# Patient Record
Sex: FEMALE | Race: BLACK OR AFRICAN AMERICAN | Employment: FULL TIME | URBAN - METROPOLITAN AREA
[De-identification: names, ages, dates, MRNs, and addresses within clinical notes are randomized per-mention and may not be internally consistent; named-entity substitution may affect disease eponyms.]

---

## 2021-01-01 ENCOUNTER — APPOINTMENT (OUTPATIENT)
Dept: GENERAL RADIOLOGY | Age: 46
End: 2021-01-01
Attending: EMERGENCY MEDICINE
Payer: COMMERCIAL

## 2021-01-01 ENCOUNTER — HOSPITAL ENCOUNTER (EMERGENCY)
Age: 46
Discharge: HOME | End: 2021-01-01
Attending: EMERGENCY MEDICINE
Payer: COMMERCIAL

## 2021-01-01 VITALS
TEMPERATURE: 97.6 F | HEART RATE: 83 BPM | OXYGEN SATURATION: 100 % | HEIGHT: 61 IN | WEIGHT: 173 LBS | RESPIRATION RATE: 14 BRPM | SYSTOLIC BLOOD PRESSURE: 129 MMHG | BODY MASS INDEX: 32.66 KG/M2 | DIASTOLIC BLOOD PRESSURE: 89 MMHG

## 2021-01-01 DIAGNOSIS — R51.9 SINUS HEADACHE: Primary | ICD-10-CM

## 2021-01-01 DIAGNOSIS — R42 DIZZINESS: ICD-10-CM

## 2021-01-01 DIAGNOSIS — J01.91 ACUTE RECURRENT SINUSITIS, UNSPECIFIED LOCATION: ICD-10-CM

## 2021-01-01 LAB
ALBUMIN SERPL-MCNC: 3.5 G/DL (ref 3.4–5)
ALBUMIN/GLOB SERPL: 1.1 {RATIO} (ref 0.8–1.7)
ALP SERPL-CCNC: 74 U/L (ref 45–117)
ALT SERPL-CCNC: 22 U/L (ref 13–56)
ANION GAP SERPL CALC-SCNC: 6 MMOL/L (ref 3–18)
AST SERPL-CCNC: 8 U/L (ref 10–38)
ATRIAL RATE: 75 BPM
BASOPHILS # BLD: 0 K/UL (ref 0–0.1)
BASOPHILS NFR BLD: 1 % (ref 0–2)
BILIRUB SERPL-MCNC: 0.3 MG/DL (ref 0.2–1)
BUN SERPL-MCNC: 18 MG/DL (ref 7–18)
BUN/CREAT SERPL: 19 (ref 12–20)
CALCIUM SERPL-MCNC: 9.1 MG/DL (ref 8.5–10.1)
CALCULATED P AXIS, ECG09: 56 DEGREES
CALCULATED R AXIS, ECG10: 5 DEGREES
CALCULATED T AXIS, ECG11: 45 DEGREES
CHLORIDE SERPL-SCNC: 106 MMOL/L (ref 100–111)
CK MB CFR SERPL CALC: NORMAL % (ref 0–4)
CK MB SERPL-MCNC: <1 NG/ML (ref 5–25)
CK SERPL-CCNC: 30 U/L (ref 26–192)
CO2 SERPL-SCNC: 28 MMOL/L (ref 21–32)
CREAT SERPL-MCNC: 0.95 MG/DL (ref 0.6–1.3)
DIAGNOSIS, 93000: NORMAL
DIFFERENTIAL METHOD BLD: NORMAL
EOSINOPHIL # BLD: 0.3 K/UL (ref 0–0.4)
EOSINOPHIL NFR BLD: 4 % (ref 0–5)
ERYTHROCYTE [DISTWIDTH] IN BLOOD BY AUTOMATED COUNT: 13.5 % (ref 11.6–14.5)
GLOBULIN SER CALC-MCNC: 3.1 G/DL (ref 2–4)
GLUCOSE SERPL-MCNC: 87 MG/DL (ref 74–99)
HCT VFR BLD AUTO: 36.5 % (ref 35–45)
HGB BLD-MCNC: 12 G/DL (ref 12–16)
LYMPHOCYTES # BLD: 1.6 K/UL (ref 0.9–3.6)
LYMPHOCYTES NFR BLD: 27 % (ref 21–52)
MCH RBC QN AUTO: 28 PG (ref 24–34)
MCHC RBC AUTO-ENTMCNC: 32.9 G/DL (ref 31–37)
MCV RBC AUTO: 85.1 FL (ref 74–97)
MONOCYTES # BLD: 0.6 K/UL (ref 0.05–1.2)
MONOCYTES NFR BLD: 10 % (ref 3–10)
NEUTS SEG # BLD: 3.6 K/UL (ref 1.8–8)
NEUTS SEG NFR BLD: 58 % (ref 40–73)
P-R INTERVAL, ECG05: 132 MS
PLATELET # BLD AUTO: 244 K/UL (ref 135–420)
PMV BLD AUTO: 9.8 FL (ref 9.2–11.8)
POTASSIUM SERPL-SCNC: 4.2 MMOL/L (ref 3.5–5.5)
PROT SERPL-MCNC: 6.6 G/DL (ref 6.4–8.2)
Q-T INTERVAL, ECG07: 374 MS
QRS DURATION, ECG06: 82 MS
QTC CALCULATION (BEZET), ECG08: 417 MS
RBC # BLD AUTO: 4.29 M/UL (ref 4.2–5.3)
SODIUM SERPL-SCNC: 140 MMOL/L (ref 136–145)
TROPONIN I SERPL-MCNC: <0.02 NG/ML (ref 0–0.04)
VENTRICULAR RATE, ECG03: 75 BPM
WBC # BLD AUTO: 6.1 K/UL (ref 4.6–13.2)

## 2021-01-01 PROCEDURE — 96374 THER/PROPH/DIAG INJ IV PUSH: CPT

## 2021-01-01 PROCEDURE — 99284 EMERGENCY DEPT VISIT MOD MDM: CPT

## 2021-01-01 PROCEDURE — 82553 CREATINE MB FRACTION: CPT

## 2021-01-01 PROCEDURE — 74011250636 HC RX REV CODE- 250/636: Performed by: EMERGENCY MEDICINE

## 2021-01-01 PROCEDURE — 85025 COMPLETE CBC W/AUTO DIFF WBC: CPT

## 2021-01-01 PROCEDURE — 93005 ELECTROCARDIOGRAM TRACING: CPT

## 2021-01-01 PROCEDURE — 80053 COMPREHEN METABOLIC PANEL: CPT

## 2021-01-01 PROCEDURE — 74011250637 HC RX REV CODE- 250/637: Performed by: EMERGENCY MEDICINE

## 2021-01-01 PROCEDURE — 71045 X-RAY EXAM CHEST 1 VIEW: CPT

## 2021-01-01 PROCEDURE — 96361 HYDRATE IV INFUSION ADD-ON: CPT

## 2021-01-01 RX ORDER — NAPROXEN 500 MG/1
500 TABLET ORAL 2 TIMES DAILY WITH MEALS
Qty: 28 TAB | Refills: 0 | Status: SHIPPED | OUTPATIENT
Start: 2021-01-01 | End: 2021-01-15

## 2021-01-01 RX ORDER — SODIUM CHLORIDE 0.9 % (FLUSH) 0.9 %
5-40 SYRINGE (ML) INJECTION EVERY 8 HOURS
Status: DISCONTINUED | OUTPATIENT
Start: 2021-01-01 | End: 2021-01-01 | Stop reason: HOSPADM

## 2021-01-01 RX ORDER — NAPROXEN 500 MG/1
500 TABLET ORAL 2 TIMES DAILY WITH MEALS
Qty: 28 TAB | Refills: 0 | Status: SHIPPED | OUTPATIENT
Start: 2021-01-01 | End: 2021-01-01 | Stop reason: SDUPTHER

## 2021-01-01 RX ORDER — METOCLOPRAMIDE HYDROCHLORIDE 5 MG/ML
5 INJECTION INTRAMUSCULAR; INTRAVENOUS
Status: COMPLETED | OUTPATIENT
Start: 2021-01-01 | End: 2021-01-01

## 2021-01-01 RX ORDER — AMOXICILLIN 875 MG/1
875 TABLET, FILM COATED ORAL 2 TIMES DAILY
Qty: 20 TAB | Refills: 0 | Status: SHIPPED | OUTPATIENT
Start: 2021-01-01 | End: 2021-01-01 | Stop reason: SDUPTHER

## 2021-01-01 RX ORDER — METOCLOPRAMIDE 5 MG/1
5 TABLET ORAL
Qty: 12 TAB | Refills: 0 | Status: SHIPPED | OUTPATIENT
Start: 2021-01-01 | End: 2021-01-11

## 2021-01-01 RX ORDER — AZELASTINE 1 MG/ML
1 SPRAY, METERED NASAL
Qty: 1 BOTTLE | Refills: 0 | Status: SHIPPED | OUTPATIENT
Start: 2021-01-01 | End: 2021-01-01 | Stop reason: SDUPTHER

## 2021-01-01 RX ORDER — SODIUM CHLORIDE 0.9 % (FLUSH) 0.9 %
5-40 SYRINGE (ML) INJECTION AS NEEDED
Status: DISCONTINUED | OUTPATIENT
Start: 2021-01-01 | End: 2021-01-01 | Stop reason: HOSPADM

## 2021-01-01 RX ORDER — CETIRIZINE HCL 10 MG
10 TABLET ORAL DAILY
Qty: 15 TAB | Refills: 0 | Status: SHIPPED | OUTPATIENT
Start: 2021-01-01

## 2021-01-01 RX ORDER — METOCLOPRAMIDE 5 MG/1
5 TABLET ORAL
Qty: 12 TAB | Refills: 0 | Status: SHIPPED | OUTPATIENT
Start: 2021-01-01 | End: 2021-01-01 | Stop reason: SDUPTHER

## 2021-01-01 RX ORDER — AZELASTINE 1 MG/ML
1 SPRAY, METERED NASAL
Qty: 1 BOTTLE | Refills: 0 | Status: SHIPPED | OUTPATIENT
Start: 2021-01-01

## 2021-01-01 RX ORDER — CETIRIZINE HCL 10 MG
10 TABLET ORAL DAILY
Qty: 15 TAB | Refills: 0 | Status: SHIPPED | OUTPATIENT
Start: 2021-01-01 | End: 2021-01-01 | Stop reason: SDUPTHER

## 2021-01-01 RX ORDER — SCOLOPAMINE TRANSDERMAL SYSTEM 1 MG/1
1 PATCH, EXTENDED RELEASE TRANSDERMAL
Status: DISCONTINUED | OUTPATIENT
Start: 2021-01-01 | End: 2021-01-01 | Stop reason: HOSPADM

## 2021-01-01 RX ORDER — AMOXICILLIN 875 MG/1
875 TABLET, FILM COATED ORAL 2 TIMES DAILY
Qty: 20 TAB | Refills: 0 | Status: SHIPPED | OUTPATIENT
Start: 2021-01-01 | End: 2021-01-11

## 2021-01-01 RX ADMIN — METOCLOPRAMIDE 5 MG: 5 INJECTION, SOLUTION INTRAMUSCULAR; INTRAVENOUS at 13:15

## 2021-01-01 RX ADMIN — SODIUM CHLORIDE 500 ML: 900 INJECTION, SOLUTION INTRAVENOUS at 13:15

## 2021-01-01 NOTE — ED PROVIDER NOTES
EMERGENCY DEPARTMENT HISTORY AND PHYSICAL EXAM    Date: 1/1/2021  Patient Name: Saima Cope    History of Presenting Illness     Chief Complaint   Patient presents with    Headache    Chest Pain         History Provided By: Patient    Saima Cope is a 54 y.o. female who presents to the emergency department C/O headache, dizziness, chest tightness. Patient states she has been having these symptoms intermittently for a couple of weeks but seem to be getting worse over the last couple of days. She states she was initially seen for this problem in [facility] where she was diagnosed with a sinus infection and put on Levaquin. She states it does not feel like it is making any difference. States that headache is located all over her head as well as her sinus regions. States the dizziness does feel like the room is spinning sometimes and feeling off balance and slightly lightheaded. She states she gets some intermittent tightness in her chest but denies any shortness of breath. She denies any cough or fever. No other complaints. PCP: ANGELICA Kincaid    Current Facility-Administered Medications   Medication Dose Route Frequency Provider Last Rate Last Admin    sodium chloride (NS) flush 5-40 mL  5-40 mL IntraVENous Q8H Tushar Campbell DO        sodium chloride (NS) flush 5-40 mL  5-40 mL IntraVENous PRN Tushar Campbell DO        scopolamine (TRANSDERM-SCOP) 1 mg over 3 days 1 Patch  1 Patch TransDERmal Q72H Tushar Campbell DO   1 Patch at 01/01/21 1315     Current Outpatient Medications   Medication Sig Dispense Refill    metoclopramide HCl (REGLAN) 5 mg tablet Take 1 Tab by mouth every six (6) hours as needed for Nausea for up to 10 days. 12 Tab 0    amoxicillin (AMOXIL) 875 mg tablet Take 1 Tab by mouth two (2) times a day for 10 days.  20 Tab 0    azelastine (ASTELIN) 137 mcg (0.1 %) nasal spray 1 [place] by Both Nostrils route two (2) times daily as needed for Rhinitis (nasal congestion). Use in each nostril as directed 1 Bottle 0    cetirizine (ZyrTEC) 10 mg tablet Take 1 Tab by mouth daily. 15 Tab 0    naproxen (NAPROSYN) 500 mg tablet Take 1 Tab by mouth two (2) times daily (with meals) for 14 days. 28 Tab 0       Past History     Past Medical History:  Past Medical History:   Diagnosis Date    Acid reflux     Hypertension        Past Surgical History:  History reviewed. No pertinent surgical history. Family History:  History reviewed. No pertinent family history. Social History:  Social History     Tobacco Use    Smoking status: Never Smoker   Substance Use Topics    Alcohol use: Never     Frequency: Never    Drug use: Never       Allergies:  No Known Allergies      Review of Systems   Review of Systems   Constitutional: Positive for fatigue. HENT: Positive for congestion, sinus pressure and sinus pain. Respiratory: Positive for chest tightness. Negative for shortness of breath. Cardiovascular: Positive for chest pain. Neurological: Positive for dizziness and headaches. Negative for syncope, weakness and light-headedness. All other systems reviewed and are negative. All other systems reviewed and are negative.     Physical Exam     Vitals:    01/01/21 1231 01/01/21 1330   BP: 128/86 129/89   Pulse: 85 83   Resp: 16 14   Temp: 97.6 °F (36.4 °C)    SpO2: 100% 100%   Weight: 78.5 kg (173 lb)    Height: 5' 1\" (1.549 m)      Physical Exam    Nursing notes and vital signs reviewed    Airway: intact, speaking normally  Breathing: No apparent distress, no cyanosis  Circulation: Peripheral pulses equal    Constitutional: Non toxic appearing, no acute distress, appearing stated age  HEENT:  Head: Normocephalic, Atraumatic  Eyes: PERRL, EOMI, No conjunctival injection  Ears: external ears normal  Nose: No rhinorrhea, external nose normal  Throat: mucous membranes moist  Neck: symmetric, trachea midline, no obvious swelling, no JVD  Cardiovascular: Regular rate and rhythm, no murmurs  Lungs: Clear to ausculation bilaterally, No stridor, Normal work of breathing and chest excursion bilaterally  Abdomen: Soft, non tender, non distended, normoactive bowel sounds, No rigidity, no peritoneal signs  Musculoskeletal:  No evidence of obvious deformity to the back, neck or extremities, no LE edema  Skin: Warm, dry, No obvious rashes  Neuro: Alert and oriented x 3, CN 2-12 intact, normal speech, strength and sensation full and symmetric bilaterally  Psychiatric: Normal mood and affect      Diagnostic Study Results     Labs -     Recent Results (from the past 72 hour(s))   CBC WITH AUTOMATED DIFF    Collection Time: 01/01/21 12:44 PM   Result Value Ref Range    WBC 6.1 4.6 - 13.2 K/uL    RBC 4.29 4. 20 - 5.30 M/uL    HGB 12.0 12.0 - 16.0 g/dL    HCT 36.5 35.0 - 45.0 %    MCV 85.1 74.0 - 97.0 FL    MCH 28.0 24.0 - 34.0 PG    MCHC 32.9 31.0 - 37.0 g/dL    RDW 13.5 11.6 - 14.5 %    PLATELET 965 519 - 730 K/uL    MPV 9.8 9.2 - 11.8 FL    NEUTROPHILS 58 40 - 73 %    LYMPHOCYTES 27 21 - 52 %    MONOCYTES 10 3 - 10 %    EOSINOPHILS 4 0 - 5 %    BASOPHILS 1 0 - 2 %    ABS. NEUTROPHILS 3.6 1.8 - 8.0 K/UL    ABS. LYMPHOCYTES 1.6 0.9 - 3.6 K/UL    ABS. MONOCYTES 0.6 0.05 - 1.2 K/UL    ABS. EOSINOPHILS 0.3 0.0 - 0.4 K/UL    ABS.  BASOPHILS 0.0 0.0 - 0.1 K/UL    DF AUTOMATED     CARDIAC PANEL,(CK, CKMB & TROPONIN)    Collection Time: 01/01/21 12:44 PM   Result Value Ref Range    CK - MB <1.0 <3.6 ng/ml    CK-MB Index  0.0 - 4.0 %     CALCULATION NOT PERFORMED WHEN RESULT IS BELOW LINEAR LIMIT    CK 30 26 - 192 U/L    Troponin-I, QT <0.02 0.0 - 1.367 NG/ML   METABOLIC PANEL, COMPREHENSIVE    Collection Time: 01/01/21 12:44 PM   Result Value Ref Range    Sodium 140 136 - 145 mmol/L    Potassium 4.2 3.5 - 5.5 mmol/L    Chloride 106 100 - 111 mmol/L    CO2 28 21 - 32 mmol/L    Anion gap 6 3.0 - 18 mmol/L    Glucose 87 74 - 99 mg/dL    BUN 18 7.0 - 18 MG/DL    Creatinine 0.95 0.6 - 1.3 MG/DL BUN/Creatinine ratio 19 12 - 20      GFR est AA >60 >60 ml/min/1.73m2    GFR est non-AA >60 >60 ml/min/1.73m2    Calcium 9.1 8.5 - 10.1 MG/DL    Bilirubin, total 0.3 0.2 - 1.0 MG/DL    ALT (SGPT) 22 13 - 56 U/L    AST (SGOT) 8 (L) 10 - 38 U/L    Alk. phosphatase 74 45 - 117 U/L    Protein, total 6.6 6.4 - 8.2 g/dL    Albumin 3.5 3.4 - 5.0 g/dL    Globulin 3.1 2.0 - 4.0 g/dL    A-G Ratio 1.1 0.8 - 1.7         Radiologic Studies -   XR CHEST PORT   Final Result   IMPRESSION:      No acute radiographic cardiopulmonary abnormality. CT Results  (Last 48 hours)    None        CXR Results  (Last 48 hours)               01/01/21 1305  XR CHEST PORT Final result    Impression:  IMPRESSION:       No acute radiographic cardiopulmonary abnormality. Narrative:  EXAM: XR CHEST PORT       CLINICAL INDICATION/HISTORY: chest pain   -Additional: None       COMPARISON: None       TECHNIQUE: Frontal view of the chest       _______________       FINDINGS:       HEART AND MEDIASTINUM: Normal cardiac size and mediastinal contours. LUNGS AND PLEURAL SPACES: No focal pneumonic consolidation, pneumothorax, or   pleural effusion. BONY THORAX AND SOFT TISSUES: No acute osseous abnormality       _______________                 Medications given in the ED-  Medications   sodium chloride (NS) flush 5-40 mL (has no administration in time range)   sodium chloride (NS) flush 5-40 mL (has no administration in time range)   scopolamine (TRANSDERM-SCOP) 1 mg over 3 days 1 Patch (1 Patch TransDERmal Apply Patch 1/1/21 1315)   metoclopramide HCl (REGLAN) injection 5 mg (5 mg IntraVENous Given 1/1/21 1315)   sodium chloride 0.9 % bolus infusion 500 mL (500 mL IntraVENous New Bag 1/1/21 1315)         Medical Decision Making     I reviewed the vital signs, available nursing notes, past medical history, past surgical history, family history and social history.     Vital Signs interpretation- I have reviewed the patient's vital signs.    Pulse Oximetry interpretation - 100% on Room air     Cardiac Monitor interpretation:  Rate: 85 bpm  Rhythm: sinus    EKG interpretation: (Preliminary)  EKG interpretation by Dr. Frank Jalloh 75 sinus rhythm with mild sinus arrhythmia, normal axis, no ST changes    Records Reviewed: Nursing Notes and Old Medical Records    Procedures:  Procedures    ED Course & MDM:   Patient symptoms could be related to a sinus problem with sinusitis and headache. Considering her symptoms will obtain blood work and chest x-ray    Patient states her symptoms are slightly improved with the Reglan and scopolamine and fluids. Laboratory findings and chest x-ray normal    Discussed with the patient that there are symptoms appear as a likely sinusitis related infection. Continue supportive care with prescribed medications as directed, increase fluids, and Tylenol/Motrin for fevers. Recommended to follow-up with ENT doctor. Recommended come back to emergency department if symptoms worsen including but not limited to difficulty breathing. They understand and agree to plan      Diagnosis and Disposition       DISCHARGE NOTE:    Michael Hernandez  results have been reviewed with her. She has been counseled regarding her diagnosis, treatment, and plan. She verbally conveys understanding and agreement of the signs, symptoms, diagnosis, treatment and prognosis and additionally agrees to follow up as discussed. She also agrees with the care-plan and conveys that all of her questions have been answered. I have also provided discharge instructions for her that include: educational information regarding their diagnosis and treatment, and list of reasons why they would want to return to the ED prior to their follow-up appointment, should her condition change. She has been provided with education for proper emergency department utilization. CLINICAL IMPRESSION:    1. Sinus headache    2.  Acute recurrent sinusitis, unspecified location    3. Dizziness        PLAN:  1. D/C Home  2. Current Discharge Medication List      START taking these medications    Details   metoclopramide HCl (REGLAN) 5 mg tablet Take 1 Tab by mouth every six (6) hours as needed for Nausea for up to 10 days. Qty: 12 Tab, Refills: 0      amoxicillin (AMOXIL) 875 mg tablet Take 1 Tab by mouth two (2) times a day for 10 days. Qty: 20 Tab, Refills: 0      azelastine (ASTELIN) 137 mcg (0.1 %) nasal spray 1 [place] by Both Nostrils route two (2) times daily as needed for Rhinitis (nasal congestion). Use in each nostril as directed  Qty: 1 Bottle, Refills: 0      cetirizine (ZyrTEC) 10 mg tablet Take 1 Tab by mouth daily. Qty: 15 Tab, Refills: 0      naproxen (NAPROSYN) 500 mg tablet Take 1 Tab by mouth two (2) times daily (with meals) for 14 days. Qty: 28 Tab, Refills: 0           3. Follow-up Information     Follow up With Specialties Details Why Contact Info    Molly Ruelas MD Otolaryngology, Surgery Schedule an appointment as soon as possible for a visit  for ENT doctor [address] [address]  [phone]          _______________________________      Please note that this dictation was completed with Chat[employer]ing[employer], the computer voice recognition software. Quite often unanticipated grammatical, syntax, homophones, and other interpretive errors are inadvertently transcribed by the computer software. Please disregard these errors. Please excuse any errors that have escaped final proofreading.

## 2021-01-01 NOTE — ED NOTES
DR MATT PATIENT    RETURNING CALL TO NURSE    CALL BACK NUMBER  [phone]   I have reviewed discharge instructions with the patient. The patient verbalized understanding.

## 2021-01-01 NOTE — DISCHARGE INSTRUCTIONS
Take amoxicillin as antibiotic  Use azelastine nasal spray to clear nasal passages  Take reglan for headache and nausea  Take naproxen for headache

## 2021-01-01 NOTE — LETTER
NOTIFICATION RETURN TO WORK / SCHOOL 
 
1/1/2021 2:21 PM 
 
Ms. Jarod Stein 
[address] [place] [address] To Whom It May Concern: Jarod Stein is currently under the care of [facility] EMERGENCY DEPT. She will return to work/school on:1/2/2021 Jarod Stein may return to work/school with the following restrictions: none If there are questions or concerns please have the patient contact our office.  
 
 
 
Sincerely, 
 
 
Ann-Marie Saldaña RN

## 2022-03-06 ENCOUNTER — HOSPITAL ENCOUNTER (EMERGENCY)
Age: 47
Discharge: HOME | End: 2022-03-06
Attending: EMERGENCY MEDICINE
Payer: COMMERCIAL

## 2022-03-06 VITALS
HEIGHT: 61 IN | OXYGEN SATURATION: 99 % | HEART RATE: 89 BPM | TEMPERATURE: 98.1 F | RESPIRATION RATE: 14 BRPM | WEIGHT: 180 LBS | SYSTOLIC BLOOD PRESSURE: 149 MMHG | DIASTOLIC BLOOD PRESSURE: 98 MMHG | BODY MASS INDEX: 33.99 KG/M2

## 2022-03-06 DIAGNOSIS — J01.91 ACUTE RECURRENT SINUSITIS, UNSPECIFIED LOCATION: Primary | ICD-10-CM

## 2022-03-06 PROCEDURE — 99283 EMERGENCY DEPT VISIT LOW MDM: CPT

## 2022-03-06 RX ORDER — LOSARTAN POTASSIUM 25 MG/1
TABLET ORAL DAILY
COMMUNITY

## 2022-03-06 RX ORDER — AMLODIPINE BESYLATE 5 MG/1
5 TABLET ORAL DAILY
COMMUNITY

## 2022-03-06 RX ORDER — FLUTICASONE PROPIONATE 50 MCG
2 SPRAY, SUSPENSION (ML) NASAL DAILY
Qty: 1 EACH | Refills: 0 | Status: SHIPPED | OUTPATIENT
Start: 2022-03-06

## 2022-03-06 RX ORDER — LORATADINE 10 MG/1
10 TABLET ORAL DAILY
Qty: 30 TABLET | Refills: 0 | Status: SHIPPED | OUTPATIENT
Start: 2022-03-06

## 2022-03-06 RX ORDER — OMEPRAZOLE 10 MG/1
10 CAPSULE, DELAYED RELEASE ORAL DAILY
COMMUNITY

## 2022-03-06 RX ORDER — PREDNISONE 10 MG/1
TABLET ORAL
Qty: 48 TABLET | Refills: 0 | Status: SHIPPED | OUTPATIENT
Start: 2022-03-06

## 2022-03-07 NOTE — ED PROVIDER NOTES
EMERGENCY DEPARTMENT HISTORY AND PHYSICAL EXAM    Date: 3/6/2022  Patient Name: Devon Vega    History of Presenting Illness     Chief Complaint   Patient presents with    Sinus Infection         History Provided By: Patient    Chief Complaint: sinus infection      Additional History (Context):   8:55 PM  Devon Vega is a 49 y.o. female with PMHX hypertension presents to the emergency department C/O sinus pressure pain and nasal congestion and ear pain for several weeks. Patient was seen at urgent care recently and started on amoxicillin which she has been taking without relief. She does use nasal sprays at home that she states are similar to Afrin. Tonight she noted that she had blood in her mucus when she tried to clear her nose. No fevers. PCP: ANGELICA Pa    Current Outpatient Medications   Medication Sig Dispense Refill    losartan (COZAAR) 25 mg tablet Take  by mouth daily.  amLODIPine (NORVASC) 5 mg tablet Take 5 mg by mouth daily.  omeprazole (PRILOSEC) 10 mg capsule Take 10 mg by mouth daily.  predniSONE (STERAPRED DS) 10 mg dose pack Take as directed 48 Tablet 0    fluticasone propionate (FLONASE) 50 mcg/actuation nasal spray 2 Sprays by Both Nostrils route daily. 1 Each 0    loratadine (Claritin) 10 mg tablet Take 1 Tablet by mouth daily. 30 Tablet 0    azelastine (ASTELIN) 137 mcg (0.1 %) nasal spray 1 [place] by Both Nostrils route two (2) times daily as needed for Rhinitis (nasal congestion). Use in each nostril as directed 1 Bottle 0    cetirizine (ZyrTEC) 10 mg tablet Take 1 Tab by mouth daily. 15 Tab 0       Past History     Past Medical History:  Past Medical History:   Diagnosis Date    Acid reflux     Hypertension        Past Surgical History:  No past surgical history on file. Family History:  No family history on file.     Social History:  Social History     Tobacco Use    Smoking status: Never Smoker    Smokeless tobacco: Not on file Substance Use Topics    Alcohol use: Never    Drug use: Never       Allergies:  No Known Allergies    Review of Systems   Review of Systems   Constitutional: Negative for chills and fever. HENT: Positive for congestion, ear pain, rhinorrhea, sinus pressure and sinus pain. Negative for ear discharge, facial swelling, sore throat, tinnitus and trouble swallowing. Respiratory: Negative for shortness of breath. Cardiovascular: Negative for chest pain. Gastrointestinal: Negative for abdominal pain, diarrhea, nausea and vomiting. Musculoskeletal: Negative for back pain. Neurological: Positive for headaches. Negative for dizziness, weakness and numbness. All other systems reviewed and are negative. Physical Exam     Vitals:    03/06/22 2052   BP: (!) 149/98   Pulse: 89   Resp: 14   Temp: 98.1 °F (36.7 °C)   SpO2: 99%   Weight: 81.6 kg (180 lb)   Height: 5' 1\" (1.549 m)     Physical Exam  Vitals and nursing note reviewed. Constitutional:       Appearance: She is well-developed. HENT:      Head: Normocephalic and atraumatic. Right Ear: Tympanic membrane is bulging. Tympanic membrane is not injected, scarred, perforated or erythematous. Left Ear: Tympanic membrane is bulging. Tympanic membrane is not injected, scarred, perforated or erythematous. Nose: Mucosal edema and congestion present. Right Turbinates: Swollen. Left Turbinates: Swollen. Right Sinus: Maxillary sinus tenderness and frontal sinus tenderness present. Left Sinus: Maxillary sinus tenderness and frontal sinus tenderness present. Cardiovascular:      Rate and Rhythm: Normal rate and regular rhythm. Heart sounds: Normal heart sounds. No murmur heard. Pulmonary:      Effort: Pulmonary effort is normal. No respiratory distress. Breath sounds: Normal breath sounds. No wheezing or rales. Abdominal:      General: Bowel sounds are normal.      Palpations: Abdomen is soft. Tenderness: There is no abdominal tenderness. Musculoskeletal:      Cervical back: Normal range of motion and neck supple. Neurological:      Mental Status: She is alert and oriented to person, place, and time. Psychiatric:         Judgment: Judgment normal.           Diagnostic Study Results     Labs:   No results found for this or any previous visit (from the past 12 hour(s)). Radiologic Studies:   No orders to display     CT Results  (Last 48 hours)    None        CXR Results  (Last 48 hours)    None          Medical Decision Making   I am the first provider for this patient. I reviewed the vital signs, available nursing notes, past medical history, past surgical history, family history and social history. Vital Signs: Reviewed the patient's vital signs. Pulse Oximetry Analysis: 99% on RA       Records Reviewed: Nursing Notes and Old Medical Records    Procedures:  Procedures    ED Course:   8:55 PM Initial assessment performed. The patients presenting problems have been discussed, and they are in agreement with the care plan formulated and outlined with them. I have encouraged them to ask questions as they arise throughout their visit. Discussion:  Pt presents with sinusitis congestion. She is currently taking amoxicillin that she was prescribed earlier this week. Today she had epistaxis that resolved on its own. She does report that she has been taking Afrin-like medications recently suspected but she has become dependent on Afrin will give steroid have patient start Claritin and Flonase and follow-up with ENT. Did not feel additional antibiotic is indicated at this time. Strict return precautions given, pt offering no questions or complaints. Diagnosis and Disposition     DISCHARGE NOTE:  Jeremías Mccray  results have been reviewed with her. She has been counseled regarding her diagnosis, treatment, and plan.   She verbally conveys understanding and agreement of the signs, symptoms, diagnosis, treatment and prognosis and additionally agrees to follow up as discussed. She also agrees with the care-plan and conveys that all of her questions have been answered. I have also provided discharge instructions for her that include: educational information regarding their diagnosis and treatment, and list of reasons why they would want to return to the ED prior to their follow-up appointment, should her condition change. She has been provided with education for proper emergency department utilization. CLINICAL IMPRESSION:    1. Acute recurrent sinusitis, unspecified location        PLAN:  1. D/C Home  2. Discharge Medication List as of 3/6/2022  9:08 PM      START taking these medications    Details   predniSONE (STERAPRED DS) 10 mg dose pack Take as directed, Normal, Disp-48 Tablet, R-0      fluticasone propionate (FLONASE) 50 mcg/actuation nasal spray 2 Sprays by Both Nostrils route daily. , Normal, Disp-1 Each, R-0      loratadine (Claritin) 10 mg tablet Take 1 Tablet by mouth daily. , Normal, Disp-30 Tablet, R-0         CONTINUE these medications which have NOT CHANGED    Details   losartan (COZAAR) 25 mg tablet Take  by mouth daily. , Historical Med      amLODIPine (NORVASC) 5 mg tablet Take 5 mg by mouth daily. , Historical Med      omeprazole (PRILOSEC) 10 mg capsule Take 10 mg by mouth daily. , Historical Med      azelastine (ASTELIN) 137 mcg (0.1 %) nasal spray 1 [place] by Both Nostrils route two (2) times daily as needed for Rhinitis (nasal congestion). Use in each nostril as directed, Normal, Disp-1 Bottle, R-0      cetirizine (ZyrTEC) 10 mg tablet Take 1 Tab by mouth daily. , Normal, Disp-15 Tab, R-0           3.    Follow-up Information     Follow up With Specialties Details Why [address], [facility], [address] [facility] Nurse Practitioner Schedule an appointment as soon as possible for a visit   [address] [place] [address]  [address] [place] [id number] [phone]      [facility] EMERGENCY DEPT Emergency Medicine  If symptoms worsen [address] José Miguel Ortega [address] [facility]    Zita Reese MD Otolaryngology, [address] [address] [facility]  [phone]                   Please note that this dictation was completed with Spotted, the computer voice recognition software. Quite often unanticipated grammatical, syntax, homophones, and other interpretive errors are inadvertently transcribed by the computer software. Please disregard these errors. Please excuse any errors that have escaped final proofreading.

## 2022-03-07 NOTE — ED TRIAGE NOTES
Patient reports to ED c/c sinus pain, and blood in mucus. Patient reports currently taking amoxicillin.